# Patient Record
Sex: FEMALE | Race: AMERICAN INDIAN OR ALASKA NATIVE | URBAN - METROPOLITAN AREA
[De-identification: names, ages, dates, MRNs, and addresses within clinical notes are randomized per-mention and may not be internally consistent; named-entity substitution may affect disease eponyms.]

---

## 2020-10-02 ENCOUNTER — Encounter (OUTPATIENT)
Dept: URBAN - METROPOLITAN AREA EXTERNAL CLINIC 14 | Facility: EXTERNAL CLINIC | Age: 40
End: 2020-10-02
Payer: COMMERCIAL

## 2020-10-02 PROCEDURE — 67113 REPAIR RETINAL DETACH CPLX: CPT | Performed by: OPHTHALMOLOGY

## 2020-10-03 ENCOUNTER — Encounter (OUTPATIENT)
Dept: URBAN - METROPOLITAN AREA CLINIC 13 | Facility: CLINIC | Age: 40
End: 2020-10-03

## 2020-10-03 PROCEDURE — 99024 POSTOP FOLLOW-UP VISIT: CPT | Performed by: OPHTHALMOLOGY

## 2020-10-13 ENCOUNTER — POST-OPERATIVE VISIT (OUTPATIENT)
Dept: URBAN - METROPOLITAN AREA CLINIC 67 | Facility: CLINIC | Age: 40
End: 2020-10-13
Payer: COMMERCIAL

## 2020-10-13 PROCEDURE — 99024 POSTOP FOLLOW-UP VISIT: CPT | Performed by: OPHTHALMOLOGY

## 2020-11-10 ENCOUNTER — Encounter (OUTPATIENT)
Dept: URBAN - METROPOLITAN AREA CLINIC 67 | Facility: CLINIC | Age: 40
End: 2020-11-10
Payer: COMMERCIAL

## 2020-11-10 PROCEDURE — 92134 CPTRZ OPH DX IMG PST SGM RTA: CPT | Performed by: OPHTHALMOLOGY

## 2021-01-12 ENCOUNTER — Encounter (OUTPATIENT)
Dept: URBAN - METROPOLITAN AREA CLINIC 67 | Facility: CLINIC | Age: 41
End: 2021-01-12
Payer: COMMERCIAL

## 2021-01-12 PROCEDURE — 92134 CPTRZ OPH DX IMG PST SGM RTA: CPT | Performed by: OPHTHALMOLOGY

## 2021-01-12 PROCEDURE — 99213 OFFICE O/P EST LOW 20 MIN: CPT | Performed by: OPHTHALMOLOGY

## 2021-04-09 ENCOUNTER — Encounter (OUTPATIENT)
Dept: URBAN - METROPOLITAN AREA CLINIC 67 | Facility: CLINIC | Age: 41
End: 2021-04-09
Payer: COMMERCIAL

## 2021-04-09 PROCEDURE — 92134 CPTRZ OPH DX IMG PST SGM RTA: CPT | Performed by: OPHTHALMOLOGY

## 2022-03-11 ENCOUNTER — OFFICE VISIT (OUTPATIENT)
Dept: URBAN - METROPOLITAN AREA CLINIC 65 | Facility: CLINIC | Age: 42
End: 2022-03-11
Payer: MEDICARE

## 2022-03-11 DIAGNOSIS — H43.11 VITREOUS HEMORRHAGE, RIGHT EYE: ICD-10-CM

## 2022-03-11 DIAGNOSIS — Z96.1 PRESENCE OF INTRAOCULAR LENS: ICD-10-CM

## 2022-03-11 DIAGNOSIS — H25.12 AGE-RELATED NUCLEAR CATARACT, LEFT EYE: ICD-10-CM

## 2022-03-11 PROCEDURE — 99214 OFFICE O/P EST MOD 30 MIN: CPT | Performed by: OPHTHALMOLOGY

## 2022-03-11 PROCEDURE — 92134 CPTRZ OPH DX IMG PST SGM RTA: CPT | Performed by: OPHTHALMOLOGY

## 2022-03-11 ASSESSMENT — INTRAOCULAR PRESSURE
OD: 24
OS: 18

## 2022-03-31 ENCOUNTER — OFFICE VISIT (OUTPATIENT)
Dept: URBAN - METROPOLITAN AREA CLINIC 64 | Facility: CLINIC | Age: 42
End: 2022-03-31
Payer: MEDICARE

## 2022-03-31 DIAGNOSIS — E11.3523 TYPE 2 DIAB W PROLIF DIAB RTNOP WITH TRCTN DTCH MACULA, BI: ICD-10-CM

## 2022-03-31 DIAGNOSIS — H26.491 OTHER SECONDARY CATARACT, RIGHT EYE: ICD-10-CM

## 2022-03-31 PROCEDURE — 99204 OFFICE O/P NEW MOD 45 MIN: CPT | Performed by: STUDENT IN AN ORGANIZED HEALTH CARE EDUCATION/TRAINING PROGRAM

## 2022-03-31 ASSESSMENT — INTRAOCULAR PRESSURE
OD: 21
OS: 24

## 2022-03-31 ASSESSMENT — VISUAL ACUITY: OS: 20/50

## 2022-03-31 NOTE — IMPRESSION/PLAN
Impression: Age-related nuclear cataract, left eye: H25.12. Plan:  Chart has been reviewed prior to seeing the patient and additional testing is necessary including A-scan/Lens Biometry. Discussed cataracts, treatment options, and surgical risks/benefits with patient including bleeding, infection, capsular break, glaucoma, corneal clouding. Patient understands there are tradeoffs to each intraocular lens choice and glasses may still be necessary after surgery. Pt understands that multifocal intraocular lenses have side effects including but not limited to Halos/Glare/Difficulty in Dim Lighting and Intermediate vision. The patient is bothered by the symptoms of his/her cataract which is not correctable with a change in glasses and their ADL's are impaired. Patient elects surgical treatment. Post op care to be patient's choice. Recommend ORA. Recommend Dexycu + Moxifloxacin (PF) Intracameral Injection. Lens Recommendation: monofocal 
Technology: no recommended Aim OS: -0.25. First Eye: OS only Notes:
Diabetic retinopathy, advised Monocular precautions.

## 2022-03-31 NOTE — IMPRESSION/PLAN
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 OD: s/p surgery x 2, last 10/2/2020- NVP, PPV/MP/SOin OS: s/p PPV/MP/ EL (EJQ) 7/15/2020 Plan: Continue care with Dr. Deborah Means as directed. Discussed ocular and systemic benefits of blood sugar control with regular visits with PCP. Recommend yearly diabetic eye exams.

## 2022-04-05 ENCOUNTER — ADULT PHYSICAL (OUTPATIENT)
Dept: URBAN - METROPOLITAN AREA CLINIC 64 | Facility: CLINIC | Age: 42
End: 2022-04-05
Payer: MEDICARE

## 2022-04-05 DIAGNOSIS — Z01.818 ENCOUNTER FOR OTHER PREPROCEDURAL EXAMINATION: Primary | ICD-10-CM

## 2022-04-05 PROCEDURE — 99203 OFFICE O/P NEW LOW 30 MIN: CPT | Performed by: PHYSICIAN ASSISTANT

## 2022-04-05 PROCEDURE — 76519 ECHO EXAM OF EYE: CPT | Performed by: STUDENT IN AN ORGANIZED HEALTH CARE EDUCATION/TRAINING PROGRAM

## 2022-04-19 ENCOUNTER — SURGERY (OUTPATIENT)
Dept: URBAN - METROPOLITAN AREA SURGERY 42 | Facility: SURGERY | Age: 42
End: 2022-04-19
Payer: MEDICARE

## 2022-04-19 DIAGNOSIS — H25.12 AGE-RELATED NUCLEAR CATARACT, LEFT EYE: Primary | ICD-10-CM

## 2022-04-19 PROCEDURE — 66984 XCAPSL CTRC RMVL W/O ECP: CPT | Performed by: STUDENT IN AN ORGANIZED HEALTH CARE EDUCATION/TRAINING PROGRAM

## 2022-04-20 ENCOUNTER — POST-OPERATIVE VISIT (OUTPATIENT)
Dept: URBAN - METROPOLITAN AREA CLINIC 64 | Facility: CLINIC | Age: 42
End: 2022-04-20
Payer: MEDICARE

## 2022-04-20 DIAGNOSIS — Z48.810 ENCOUNTER FOR SURGICAL AFTERCARE FOLLOWING SURGERY ON A SENSE ORGAN: Primary | ICD-10-CM

## 2022-04-20 PROCEDURE — 99024 POSTOP FOLLOW-UP VISIT: CPT | Performed by: OPTOMETRIST

## 2022-04-20 ASSESSMENT — INTRAOCULAR PRESSURE: OS: 17

## 2022-04-20 NOTE — IMPRESSION/PLAN
Impression: S/P Cataract Extraction by phacoemulsification with IOL placement OS - 1 Day. Encounter for surgical aftercare following surgery on a sense organ  Z48.810. Plan: S/P PE with IOL OS - doing well. Hx of PDR OU, SO in OD right now, Dr. Opal Katz. Pt. ed. to try +2.00 OTC for near work. RTC 1 wk as scheduled.

## 2022-04-26 ENCOUNTER — POST-OPERATIVE VISIT (OUTPATIENT)
Dept: URBAN - METROPOLITAN AREA CLINIC 64 | Facility: CLINIC | Age: 42
End: 2022-04-26
Payer: MEDICARE

## 2022-04-26 DIAGNOSIS — Z48.810 ENCOUNTER FOR SURGICAL AFTERCARE FOLLOWING SURGERY ON A SENSE ORGAN: Primary | ICD-10-CM

## 2022-04-26 PROCEDURE — 99024 POSTOP FOLLOW-UP VISIT: CPT | Performed by: OPTOMETRIST

## 2022-04-26 ASSESSMENT — VISUAL ACUITY: OS: 20/25

## 2022-04-26 ASSESSMENT — INTRAOCULAR PRESSURE: OS: 13

## 2022-04-26 NOTE — IMPRESSION/PLAN
Impression: S/P Cataract Extraction by phacoemulsification with IOL placement OS - 7 Days. Encounter for surgical aftercare following surgery on a sense organ  Z48.810. Plan: S/P PE with IOL OS - doing well. Hx of PDR OU, SO in OD right now, Dr. Kassie Fuentes. Pt. ed. to try +2.00 OTC for near work.

## 2022-05-19 ENCOUNTER — OFFICE VISIT (OUTPATIENT)
Dept: URBAN - METROPOLITAN AREA CLINIC 64 | Facility: CLINIC | Age: 42
End: 2022-05-19
Payer: MEDICARE

## 2022-05-19 DIAGNOSIS — H26.492 OTHER SECONDARY CATARACT, LEFT EYE: Primary | ICD-10-CM

## 2022-05-19 PROCEDURE — 99214 OFFICE O/P EST MOD 30 MIN: CPT | Performed by: OPTOMETRIST

## 2022-05-19 ASSESSMENT — INTRAOCULAR PRESSURE
OS: 16
OD: 22

## 2022-05-19 NOTE — IMPRESSION/PLAN
Impression: Other secondary cataract, left eye: H26.492. Plan: Vision was clear after sx and has gotten worse recently. Discussed YAG. Schedule YAG OS.

## 2022-06-21 ENCOUNTER — OFFICE VISIT (OUTPATIENT)
Dept: URBAN - METROPOLITAN AREA CLINIC 13 | Facility: CLINIC | Age: 42
End: 2022-06-21
Payer: MEDICARE

## 2022-06-21 DIAGNOSIS — Z96.1 PRESENCE OF INTRAOCULAR LENS: ICD-10-CM

## 2022-06-21 DIAGNOSIS — E11.3523 TYPE 2 DIABETES MELLITUS WITH PROLIFERATIVE DIABETIC RETINOPATHY WITH TRACTION RETINAL DETACHMENT INVOLVING THE MACULA, BILATERAL: Primary | ICD-10-CM

## 2022-06-21 PROCEDURE — 99214 OFFICE O/P EST MOD 30 MIN: CPT | Performed by: OPHTHALMOLOGY

## 2022-06-21 PROCEDURE — 92134 CPTRZ OPH DX IMG PST SGM RTA: CPT | Performed by: OPHTHALMOLOGY

## 2022-06-21 RX ORDER — OFLOXACIN 3 MG/ML
0.3 % SOLUTION/ DROPS OPHTHALMIC
Qty: 5 | Refills: 0 | Status: INACTIVE
Start: 2022-06-21 | End: 2022-06-23

## 2022-06-21 ASSESSMENT — INTRAOCULAR PRESSURE
OD: 22
OS: 12

## 2022-06-21 NOTE — IMPRESSION/PLAN
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 OD: s/p surgery x 2, last 10/2/2020- NVP, PPV/MP/SOin OS: s/p PPV/MP/ EL (EJQ) 7/15/2020 OCT: 06/20/22 OD: atrophy, fibrosis OS: tr ME Plan: OD: Options of sx vs observation discussed. Given history of multiple sx with significant scarring, I would recommend observation at this time. Limited visual prognosis discussed. OS: VA much better after cat sx. There is mild ME, but given good vision will observe for now. If progressive ME, consider restarting anti-VEGF injections RTC 3 months DFE OU OCT OU Re-eval Avastin

## 2022-06-22 ENCOUNTER — SURGERY (OUTPATIENT)
Dept: URBAN - METROPOLITAN AREA SURGERY 42 | Facility: SURGERY | Age: 42
End: 2022-06-22
Payer: MEDICARE

## 2022-06-22 PROCEDURE — 66821 AFTER CATARACT LASER SURGERY: CPT | Performed by: STUDENT IN AN ORGANIZED HEALTH CARE EDUCATION/TRAINING PROGRAM

## 2022-06-30 ENCOUNTER — POST-OPERATIVE VISIT (OUTPATIENT)
Dept: URBAN - METROPOLITAN AREA CLINIC 64 | Facility: CLINIC | Age: 42
End: 2022-06-30
Payer: MEDICARE

## 2022-06-30 DIAGNOSIS — Z48.810 ENCOUNTER FOR SURGICAL AFTERCARE FOLLOWING SURGERY ON A SENSE ORGAN: Primary | ICD-10-CM

## 2022-06-30 PROCEDURE — 99024 POSTOP FOLLOW-UP VISIT: CPT | Performed by: OPTOMETRIST

## 2022-06-30 PROCEDURE — 92015 DETERMINE REFRACTIVE STATE: CPT | Performed by: OPTOMETRIST

## 2022-06-30 ASSESSMENT — INTRAOCULAR PRESSURE: OS: 14

## 2022-06-30 ASSESSMENT — VISUAL ACUITY: OS: 20/30

## 2022-10-15 NOTE — IMPRESSION/PLAN
Impression: S/P YAG Capsulotomy (Yttrium Aluminum [place]) OS - 8 Days. Encounter for surgical aftercare following surgery on a sense organ  Z48.810.  Plan: Excellent post op course   Post operative instructions reviewed - Condition is improving -  RTC PRN No

## 2022-11-01 ENCOUNTER — OFFICE VISIT (OUTPATIENT)
Dept: URBAN - METROPOLITAN AREA CLINIC 13 | Facility: CLINIC | Age: 42
End: 2022-11-01
Payer: MEDICARE

## 2022-11-01 DIAGNOSIS — E11.3523 TYPE 2 DIAB W PROLIF DIAB RTNOP WITH TRCTN DTCH MACULA, BI: Primary | ICD-10-CM

## 2022-11-01 DIAGNOSIS — Z96.1 PRESENCE OF INTRAOCULAR LENS: ICD-10-CM

## 2022-11-01 PROCEDURE — 99213 OFFICE O/P EST LOW 20 MIN: CPT | Performed by: OPHTHALMOLOGY

## 2022-11-01 PROCEDURE — 92134 CPTRZ OPH DX IMG PST SGM RTA: CPT | Performed by: OPHTHALMOLOGY

## 2022-11-01 ASSESSMENT — INTRAOCULAR PRESSURE
OS: 15
OD: 22

## 2022-11-01 NOTE — IMPRESSION/PLAN
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 OD: s/p surgery x 2, last 10/2/2020- NVP, PPV/MP/SOin OS: s/p PPV/MP/ EL (EJQ) 7/15/2020 OCT: 11/01/22 OD: atrophy, fibrosis OS: tr ME Plan: OD: Options of sx vs observation discussed. Given history of multiple sx with significant scarring, I would recommend observation at this time. Limited visual prognosis discussed. OS: VA much better after cat sx. There is mild ME, but given good vision will observe for now. If progressive ME, consider restarting anti-VEGF injections RTC 6 months DFE OU OCT OU Re-eval Avastin

## 2023-05-16 ENCOUNTER — OFFICE VISIT (OUTPATIENT)
Dept: URBAN - METROPOLITAN AREA CLINIC 13 | Facility: CLINIC | Age: 43
End: 2023-05-16
Payer: MEDICARE

## 2023-05-16 DIAGNOSIS — Z96.1 PRESENCE OF INTRAOCULAR LENS: ICD-10-CM

## 2023-05-16 DIAGNOSIS — H50.9 STRABISMUS: ICD-10-CM

## 2023-05-16 DIAGNOSIS — E11.3523 TYPE 2 DIAB W PROLIF DIAB RTNOP WITH TRCTN DTCH MACULA, BI: Primary | ICD-10-CM

## 2023-05-16 PROCEDURE — 92134 CPTRZ OPH DX IMG PST SGM RTA: CPT | Performed by: OPHTHALMOLOGY

## 2023-05-16 PROCEDURE — 99213 OFFICE O/P EST LOW 20 MIN: CPT | Performed by: OPHTHALMOLOGY

## 2023-05-16 ASSESSMENT — INTRAOCULAR PRESSURE
OD: 20
OS: 16

## 2023-05-16 NOTE — IMPRESSION/PLAN
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 OD: s/p surgery x 2, last 10/2/2020- NVP, PPV/MP/SOin OS: s/p PPV/MP/ EL (EJQ) 7/15/2020 OCT: 11/01/22 OD: atrophy, fibrosis OS: tr ME Plan: OD: Options of sx vs observation discussed. Given history of multiple sx with significant scarring, I would recommend observation at this time. Limited visual prognosis discussed. OS: VA much better after cat sx. There is mild ME, but given good vision will observe for now. If progressive ME, consider restarting anti-VEGF injections RTC 6 months DFE OU OCT OU Re-eval Avastin

## 2023-11-20 DIAGNOSIS — E11.3523 TYPE 2 DIABETES MELLITUS WITH PROLIFERATIVE DIABETIC RETINOPATHY WITH TRACTION RETINAL DETACHMENT INVOLVING THE MACULA, BILATERAL: Primary | ICD-10-CM

## 2023-11-20 DIAGNOSIS — H50.9 UNSPECIFIED STRABISMUS: ICD-10-CM

## 2023-11-20 DIAGNOSIS — Z96.1 PRESENCE OF INTRAOCULAR LENS: ICD-10-CM

## 2023-11-20 PROCEDURE — 92134 CPTRZ OPH DX IMG PST SGM RTA: CPT | Performed by: OPHTHALMOLOGY

## 2023-11-20 PROCEDURE — 99214 OFFICE O/P EST MOD 30 MIN: CPT | Performed by: OPHTHALMOLOGY

## 2024-09-24 ENCOUNTER — OFFICE VISIT (OUTPATIENT)
Dept: URBAN - METROPOLITAN AREA CLINIC 13 | Facility: CLINIC | Age: 44
End: 2024-09-24
Payer: MEDICARE

## 2024-09-24 DIAGNOSIS — Z96.1 PRESENCE OF INTRAOCULAR LENS: ICD-10-CM

## 2024-09-24 DIAGNOSIS — E11.3523 TYPE 2 DIAB W PROLIF DIAB RTNOP WITH TRCTN DTCH MACULA, BI: Primary | ICD-10-CM

## 2024-09-24 DIAGNOSIS — H50.9 STRABISMUS: ICD-10-CM

## 2024-09-24 PROCEDURE — 92134 CPTRZ OPH DX IMG PST SGM RTA: CPT | Performed by: OPHTHALMOLOGY

## 2024-09-24 PROCEDURE — 99213 OFFICE O/P EST LOW 20 MIN: CPT | Performed by: OPHTHALMOLOGY

## 2024-09-24 ASSESSMENT — INTRAOCULAR PRESSURE
OD: 21
OS: 19

## 2025-04-29 ENCOUNTER — OFFICE VISIT (OUTPATIENT)
Dept: URBAN - METROPOLITAN AREA CLINIC 13 | Facility: CLINIC | Age: 45
End: 2025-04-29
Payer: MEDICARE

## 2025-04-29 DIAGNOSIS — H50.9 STRABISMUS: ICD-10-CM

## 2025-04-29 DIAGNOSIS — H18.421 BAND KERATOPATHY, RIGHT EYE: ICD-10-CM

## 2025-04-29 DIAGNOSIS — Z96.1 PRESENCE OF INTRAOCULAR LENS: ICD-10-CM

## 2025-04-29 DIAGNOSIS — E11.3523 TYPE 2 DIAB W PROLIF DIAB RTNOP WITH TRCTN DTCH MACULA, BI: Primary | ICD-10-CM

## 2025-04-29 PROCEDURE — 92134 CPTRZ OPH DX IMG PST SGM RTA: CPT | Performed by: OPHTHALMOLOGY

## 2025-04-29 PROCEDURE — 99214 OFFICE O/P EST MOD 30 MIN: CPT | Performed by: OPHTHALMOLOGY

## 2025-04-29 ASSESSMENT — INTRAOCULAR PRESSURE
OD: 24
OS: 22